# Patient Record
Sex: FEMALE | Race: WHITE | Employment: FULL TIME | URBAN - METROPOLITAN AREA
[De-identification: names, ages, dates, MRNs, and addresses within clinical notes are randomized per-mention and may not be internally consistent; named-entity substitution may affect disease eponyms.]

---

## 2021-10-02 ENCOUNTER — HOSPITAL ENCOUNTER (EMERGENCY)
Age: 26
Discharge: HOME | End: 2021-10-02
Attending: EMERGENCY MEDICINE
Payer: COMMERCIAL

## 2021-10-02 VITALS
HEART RATE: 74 BPM | TEMPERATURE: 97.4 F | DIASTOLIC BLOOD PRESSURE: 58 MMHG | RESPIRATION RATE: 16 BRPM | OXYGEN SATURATION: 97 % | SYSTOLIC BLOOD PRESSURE: 97 MMHG

## 2021-10-02 DIAGNOSIS — F10.929 ACUTE ALCOHOLIC INTOXICATION WITH COMPLICATION (HCC): ICD-10-CM

## 2021-10-02 DIAGNOSIS — R45.851 SUICIDAL IDEATION: ICD-10-CM

## 2021-10-02 DIAGNOSIS — Z92.29 UP TO DATE WITH DIPHTHERIA-TETANUS VACCINATION: ICD-10-CM

## 2021-10-02 DIAGNOSIS — S51.812A FOREARM LACERATION, LEFT, INITIAL ENCOUNTER: Primary | ICD-10-CM

## 2021-10-02 LAB
A/G RATIO: 1.7 (ref 1.1–2.2)
ACETAMINOPHEN LEVEL: <5 UG/ML (ref 10–30)
ALBUMIN SERPL-MCNC: 4.2 G/DL (ref 3.4–5)
ALP BLD-CCNC: 93 U/L (ref 40–129)
ALT SERPL-CCNC: 39 U/L (ref 10–40)
ANION GAP SERPL CALCULATED.3IONS-SCNC: 11 MMOL/L (ref 3–16)
AST SERPL-CCNC: 56 U/L (ref 15–37)
BASOPHILS ABSOLUTE: 0.1 K/UL (ref 0–0.2)
BASOPHILS RELATIVE PERCENT: 1.4 %
BILIRUB SERPL-MCNC: 0.3 MG/DL (ref 0–1)
BUN BLDV-MCNC: 3 MG/DL (ref 7–20)
CALCIUM SERPL-MCNC: 8.8 MG/DL (ref 8.3–10.6)
CHLORIDE BLD-SCNC: 108 MMOL/L (ref 99–110)
CO2: 24 MMOL/L (ref 21–32)
CREAT SERPL-MCNC: 0.6 MG/DL (ref 0.6–1.1)
EOSINOPHILS ABSOLUTE: 0.1 K/UL (ref 0–0.6)
EOSINOPHILS RELATIVE PERCENT: 2.7 %
ETHANOL: 103 MG/DL (ref 0–0.08)
ETHANOL: 264 MG/DL (ref 0–0.08)
ETHANOL: NORMAL MG/DL (ref 0–0.08)
GFR AFRICAN AMERICAN: >60
GFR NON-AFRICAN AMERICAN: >60
GLOBULIN: 2.5 G/DL
GLUCOSE BLD-MCNC: 83 MG/DL (ref 70–99)
HCG QUALITATIVE: NEGATIVE
HCT VFR BLD CALC: 42.2 % (ref 36–48)
HEMOGLOBIN: 14.3 G/DL (ref 12–16)
LYMPHOCYTES ABSOLUTE: 2 K/UL (ref 1–5.1)
LYMPHOCYTES RELATIVE PERCENT: 39.6 %
MCH RBC QN AUTO: 34.7 PG (ref 26–34)
MCHC RBC AUTO-ENTMCNC: 33.9 G/DL (ref 31–36)
MCV RBC AUTO: 102.2 FL (ref 80–100)
MONOCYTES ABSOLUTE: 0.5 K/UL (ref 0–1.3)
MONOCYTES RELATIVE PERCENT: 9.8 %
NEUTROPHILS ABSOLUTE: 2.4 K/UL (ref 1.7–7.7)
NEUTROPHILS RELATIVE PERCENT: 46.5 %
PDW BLD-RTO: 12.6 % (ref 12.4–15.4)
PLATELET # BLD: 342 K/UL (ref 135–450)
PMV BLD AUTO: 8.2 FL (ref 5–10.5)
POTASSIUM REFLEX MAGNESIUM: 3.6 MMOL/L (ref 3.5–5.1)
RBC # BLD: 4.13 M/UL (ref 4–5.2)
SALICYLATE, SERUM: <0.3 MG/DL (ref 15–30)
SARS-COV-2, NAAT: NOT DETECTED
SODIUM BLD-SCNC: 143 MMOL/L (ref 136–145)
TOTAL PROTEIN: 6.7 G/DL (ref 6.4–8.2)
WBC # BLD: 5.1 K/UL (ref 4–11)

## 2021-10-02 PROCEDURE — 80143 DRUG ASSAY ACETAMINOPHEN: CPT

## 2021-10-02 PROCEDURE — 82077 ASSAY SPEC XCP UR&BREATH IA: CPT

## 2021-10-02 PROCEDURE — 80179 DRUG ASSAY SALICYLATE: CPT

## 2021-10-02 PROCEDURE — 84703 CHORIONIC GONADOTROPIN ASSAY: CPT

## 2021-10-02 PROCEDURE — 80053 COMPREHEN METABOLIC PANEL: CPT

## 2021-10-02 PROCEDURE — 99285 EMERGENCY DEPT VISIT HI MDM: CPT

## 2021-10-02 PROCEDURE — 12002 RPR S/N/AX/GEN/TRNK2.6-7.5CM: CPT

## 2021-10-02 PROCEDURE — 87635 SARS-COV-2 COVID-19 AMP PRB: CPT

## 2021-10-02 PROCEDURE — 85025 COMPLETE CBC W/AUTO DIFF WBC: CPT

## 2021-10-02 RX ORDER — CEPHALEXIN 500 MG/1
500 CAPSULE ORAL 3 TIMES DAILY
Qty: 15 CAPSULE | Refills: 0 | Status: SHIPPED | OUTPATIENT
Start: 2021-10-02 | End: 2021-10-07

## 2021-10-02 ASSESSMENT — PAIN SCALES - GENERAL
PAINLEVEL_OUTOF10: 6
PAINLEVEL_OUTOF10: 0
PAINLEVEL_OUTOF10: 0

## 2021-10-02 ASSESSMENT — PAIN DESCRIPTION - ORIENTATION: ORIENTATION: LEFT

## 2021-10-02 ASSESSMENT — PAIN DESCRIPTION - PAIN TYPE: TYPE: ACUTE PAIN

## 2021-10-02 ASSESSMENT — PAIN DESCRIPTION - LOCATION: LOCATION: ARM
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
## 2021-10-02 NOTE — ED NOTES
Patient yelling for her belongings. Cursing. Ripped the bandage off of her wound wanting \"the doctor to stitch me up and let me go\".       Ivanna Barrera RN  10/02/21 3285

## 2021-10-02 NOTE — ED NOTES
Patient has order to discharge home. Patient given discharge instructions. Patient states she understands. Patient left to lobby to await ride.       Brigette Dolan RN  10/02/21 1922
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
## 2021-10-02 NOTE — ED NOTES
Denies HI/SI. Affect flat. Guarded with interaction with this RN.       Vangie Stanley RN  10/02/21 0458

## 2021-10-02 NOTE — ED NOTES
Wound to LFA cleansed. [place] Slip in [place]    ETOH redraw @ 1330.      Srinivasan Day RN  10/02/21 9380

## 2021-10-02 NOTE — ED NOTES
Level of Care Disposition: Discharge    Patient was seen by ED provider and [facility] staff. The case was presented to psychiatric provider on-call Dr. Loco Pepper. Based on the ED evaluation and information presented to the provider by this writer, it is the recommendation of the on call psychiatric provider that the patient be discharged from a psychiatric standpoint with outpt tx referrals.                [address] [place] [address]  10/02/21 6690

## 2021-10-02 NOTE — ED PROVIDER NOTES
Patient care handed off to me pending repeat alcohol and psychiatry evaluation. Alcohol was 0. Patient evaluated by psychiatry and cleared for discharge. The arm laceration was repaired by the previous provider. As patient reports the laceration was quite deep and appears to measure approximately 4 cm, will send patient home with Keflex. She was given wound care instructions. Patient denies having any concerns about discharge home. She was instructed to establish care with PCP and was given referral for PCP. She was discharged home with strict return precautions.      Dafne Mendiola MD  10/05/21 5960

## 2021-10-02 NOTE — ED NOTES
Patient brought back from main ED. Patient belongings locked into locker. Will continue to monitor patient.       Susan Grullon RN  10/02/21 3079

## 2021-10-02 NOTE — ED NOTES
Resting quietly in bed. Patient in hallway in direct line of sight of [address] [place] [address] and nurses station. No noted distress.       Steffi Jay RN  10/02/21 0805

## 2021-10-02 NOTE — ED PROVIDER NOTES
7:31 AM: I discussed the history, physical examination, laboratory and imaging studies, and treatment plan with Dr. Toño Elam. Hailee Tillman was signed out to me in stable condition. Please see Dr. Nuris Lora documentation for details of their history, physical, and laboratory studies. Upon re-examination, a summary of Jesse Taylor's history, physical examination, and studies are as follows: Patient presented acutely intoxicated with laceration to her left arm at time of signout, patient is pending laceration repair and further observation until alcohol less than 80 at which point the behavioral team will evaluate her. On exam, patient is awake and alert. She is calm and cooperative. Easy respirations without any respiratory distress. Patient with a 4.5 cm laceration through the skin into the fatty soft tissue of the upper forearm without any active bleeding or any exposure of tendon, muscle or bone. Sherryle Moder PROCEDURE:  LACERATION REPAIR  Jesse De La Torre or their surrogate had an opportunity to ask questions, and the risks, benefits, and alternatives were discussed. The wound was prepped and draped to maintain a sterile field. A local anesthetic was used to completely anesthetize the wound. It was copiously irrigated. It was explored to its depth in a bloodless field with no sign of tendon, nerve, or vascular injury. No foreign bodies were identified. It was closed with 9x simple interrupted sutures with 4-0 ethilon. Total laceration length 4.5cm. There were no complications during the procedure. 16:30 PM: I discussed the history, physical, and treatment plan with Dr. Gene Plaza. Hailee Tillman was signed out in stable condition. Please see Dr. Ananth Jeffery note for further details, including diagnosis and disposition. At time of signout, repeat alcohol level is pending as well as behavioral evaluation and recommendations.        Agata Vasquez MD  10/03/21 0198